# Patient Record
Sex: FEMALE | Race: WHITE | Employment: OTHER | URBAN - METROPOLITAN AREA
[De-identification: names, ages, dates, MRNs, and addresses within clinical notes are randomized per-mention and may not be internally consistent; named-entity substitution may affect disease eponyms.]

---

## 2020-06-06 ENCOUNTER — HOSPITAL ENCOUNTER (EMERGENCY)
Age: 75
Discharge: HOME | End: 2020-06-06
Attending: EMERGENCY MEDICINE
Payer: MEDICARE

## 2020-06-06 ENCOUNTER — APPOINTMENT (OUTPATIENT)
Dept: GENERAL RADIOLOGY | Age: 75
End: 2020-06-06
Payer: MEDICARE

## 2020-06-06 ENCOUNTER — APPOINTMENT (OUTPATIENT)
Dept: CT IMAGING | Age: 75
End: 2020-06-06
Payer: MEDICARE

## 2020-06-06 VITALS
HEART RATE: 80 BPM | OXYGEN SATURATION: 99 % | BODY MASS INDEX: 22.08 KG/M2 | RESPIRATION RATE: 16 BRPM | TEMPERATURE: 97.9 F | WEIGHT: 120 LBS | HEIGHT: 62 IN | SYSTOLIC BLOOD PRESSURE: 153 MMHG | DIASTOLIC BLOOD PRESSURE: 93 MMHG

## 2020-06-06 PROCEDURE — 73110 X-RAY EXAM OF WRIST: CPT

## 2020-06-06 PROCEDURE — 73560 X-RAY EXAM OF KNEE 1 OR 2: CPT

## 2020-06-06 PROCEDURE — 90715 TDAP VACCINE 7 YRS/> IM: CPT | Performed by: EMERGENCY MEDICINE

## 2020-06-06 PROCEDURE — 70450 CT HEAD/BRAIN W/O DYE: CPT

## 2020-06-06 PROCEDURE — 99283 EMERGENCY DEPT VISIT LOW MDM: CPT

## 2020-06-06 PROCEDURE — 12011 RPR F/E/E/N/L/M 2.5 CM/<: CPT

## 2020-06-06 PROCEDURE — 6360000002 HC RX W HCPCS: Performed by: EMERGENCY MEDICINE

## 2020-06-06 PROCEDURE — 90471 IMMUNIZATION ADMIN: CPT | Performed by: EMERGENCY MEDICINE

## 2020-06-06 RX ORDER — LEVOTHYROXINE SODIUM 175 UG/1
175 TABLET ORAL DAILY
COMMUNITY

## 2020-06-06 RX ORDER — ACETAMINOPHEN 500 MG
500 TABLET ORAL EVERY 6 HOURS PRN
COMMUNITY

## 2020-06-06 RX ORDER — LORATADINE 10 MG/1
10 TABLET ORAL DAILY
COMMUNITY

## 2020-06-06 RX ORDER — ASPIRIN 81 MG/1
81 TABLET ORAL DAILY
COMMUNITY

## 2020-06-06 RX ORDER — ATORVASTATIN CALCIUM 20 MG/1
20 TABLET, FILM COATED ORAL DAILY
COMMUNITY

## 2020-06-06 RX ORDER — LIDOCAINE HYDROCHLORIDE 10 MG/ML
5 INJECTION, SOLUTION EPIDURAL; INFILTRATION; INTRACAUDAL; PERINEURAL ONCE
Status: DISCONTINUED | OUTPATIENT
Start: 2020-06-06 | End: 2020-06-06 | Stop reason: HOSPADM

## 2020-06-06 RX ADMIN — TETANUS TOXOID, REDUCED DIPHTHERIA TOXOID AND ACELLULAR PERTUSSIS VACCINE, ADSORBED 0.5 ML: 5; 2.5; 8; 8; 2.5 SUSPENSION INTRAMUSCULAR at 06:50

## 2020-06-06 NOTE — ED PROVIDER NOTES
[facility] Emergency Department    CHIEF COMPLAINT  Chief Complaint   Patient presents with   [place]     states slipped and fell laceration to forehead, laceration to righty wrist and pain in left knee. denies loc      HISTORY OF PRESENT ILLNESS  Jermain iMke is a 76 y.o. female  who presents to the ED complaining of getting up this morning to tend to her  who is here for loss of consciousness. The patient got up from bed quickly to go see what was going on with him and slipped on the wood floor. She injured her left knee, right wrist, and forehead. No LOC in this patient. No vomiting since the injury. No neck or back pains. No chest or abdominal pains. She feels anxious because she is worried about her . She sustained her injuries from the ground, not against any furniture. She was able to ambulate after the injury. On aspirin, no other anticoagulation. No other complaints, modifying factors or associated symptoms. I have reviewed the following from the nursing documentation. Past Medical History:   Diagnosis Date    Hyperlipidemia     Scoliosis     Thyroid disease      History reviewed. No pertinent surgical history. History reviewed. No pertinent family history.   Social History     Socioeconomic History    Marital status:      Spouse name: Not on file    Number of children: Not on file    Years of education: Not on file    Highest education level: Not on file   Occupational History    Not on file   Social Needs    Financial resource strain: Not on file    Food insecurity     Worry: Not on file     Inability: Not on file    Transportation needs     Medical: Not on file     Non-medical: Not on file   Tobacco Use    Smoking status: Never Smoker   Substance and Sexual Activity    Alcohol use: Yes     Comment: occas    Drug use: Never    Sexual activity: Not on file   Lifestyle    Physical activity     Days per week: Not on file noted to the middle of the forehead. No active bleeding or significant asssociated hematoma or other craniofacial trauma apparent. Mucous membranes are dry. NECK: Supple. BACK:      Cervical: no tenderness noted      Thoracic: no tenderness noted      Lumbar: no tenderness noted  EYES: PERRL. EOM's grossly intact. HEART/CHEST: RRR. No murmurs. No chest wall tenderness. LUNGS: Respirations unlabored. CTAB. Good air exchange. Speaking comfortably in full sentences. ABDOMEN: No tenderness. Soft. Non-distended. No masses. No organomegaly. No guarding or rebound. Normal bowel sounds throughout. MUSCULOSKELETAL:  RUE: Minor abrasion to the right wrist without roman laceration, mild tenderness, no other RUE tenderness. 2+ radial pulse. Brisk cap refill x5 digits. Sensation and motor function fully intact in the radial, ulnar, and median nerve distribution. Full range of motion of all major joints. Cardinal movements of hand fully intact. No erythema, bruising, or lacerations. Comparments are soft. LUE:  No tenderness. 2+ radial pulse. Brisk cap refill x5 digits. Sensation and motor function fully intact in the radial, ulnar, and median nerve distribution. Full range of motion of all major joints. Cardinal movements of hand fully intact. No erythema, bruising, or lacerations. Comparments are soft. RLE: Knee minimally tender with bruising noted, no deformity, no other RLE tenderness. 2+ DP and PT. Sensation and motor function fully intact. Full range of motion of all major joints. No erythema, bruising, or lacerations. Compartments are soft. 2+ patellar reflex. Achilles nontender and intact. No joint swelling or effusions are noted. LLE: Knee mildly tender with bruising noted, no other tenderness and no deformity. 2+ DP and PT. Sensation and motor function fully intact. Full range of motion of all major joints. No erythema, bruising, or lacerations. Compartments are soft.   2+ patellar Contusion of left knee, initial encounter    3. Contusion of right knee, initial encounter    4. Abrasion of right wrist, initial encounter    5. Laceration of forehead, initial encounter    6. Tetanus toxoid vaccination administered at current visit    7. Closed head injury, initial encounter        Blood pressure (!) 153/93, pulse 80, temperature 97.9 °F (36.6 °C), temperature source Oral, resp. rate 16, height 5' 2\" (1.575 m), weight 120 lb (54.4 kg), SpO2 99 %. DISPOSITION  Coco Lopez was discharged to home in stable condition. I have discussed the findings of today's workup with the patient and addressed the patient's questions and concerns. Important warning signs as well as new or worsening symptoms which would necessitate immediate return to the ED were discussed. The plan is to discharge from the ED at this time, and the patient is in stable condition. The patient acknowledged understanding is agreeable with this plan. Follow-up with:  Mikael Silva  [address] [address]  [phone]    Schedule an appointment as soon as possible for a visit in 1 week  For wound re-check, For suture removal    [facility]  ED  [address]  Go to   If symptoms worsen      DISCLAIMER: This chart was created using Dragon dictation software. Efforts were made by me to ensure accuracy, however some errors may be present due to limitations of this technology and occasionally words are not transcribed correctly.         Ben Sadler MD  06/06/20 5819

## 2020-06-06 NOTE — ED NOTES

## 2020-06-06 NOTE — ED NOTES
Bed check, fall band and be safe sign outside room. No fall socks because pt wearing gyn shoes.      Kathleen See, [address] [facility]  06/06/20 5978

## 2020-06-13 ENCOUNTER — HOSPITAL ENCOUNTER (EMERGENCY)
Age: 75
Discharge: HOME | End: 2020-06-13
Payer: MEDICARE

## 2020-06-13 VITALS — OXYGEN SATURATION: 95 % | RESPIRATION RATE: 18 BRPM | HEART RATE: 73 BPM | TEMPERATURE: 98.4 F

## 2020-06-19 ENCOUNTER — OFFICE VISIT (OUTPATIENT)
Dept: ORTHOPEDIC SURGERY | Age: 75
End: 2020-06-19
Payer: MEDICARE

## 2020-06-19 VITALS — BODY MASS INDEX: 22.08 KG/M2 | HEIGHT: 62 IN | WEIGHT: 120 LBS

## 2020-06-19 PROCEDURE — 4040F PNEUMOC VAC/ADMIN/RCVD: CPT | Performed by: PHYSICIAN ASSISTANT

## 2020-06-19 PROCEDURE — 1123F ACP DISCUSS/DSCN MKR DOCD: CPT | Performed by: PHYSICIAN ASSISTANT

## 2020-06-19 PROCEDURE — G8420 CALC BMI NORM PARAMETERS: HCPCS | Performed by: PHYSICIAN ASSISTANT

## 2020-06-19 PROCEDURE — 99203 OFFICE O/P NEW LOW 30 MIN: CPT | Performed by: PHYSICIAN ASSISTANT

## 2020-06-19 PROCEDURE — 3017F COLORECTAL CA SCREEN DOC REV: CPT | Performed by: PHYSICIAN ASSISTANT

## 2020-06-19 PROCEDURE — G8427 DOCREV CUR MEDS BY ELIG CLIN: HCPCS | Performed by: PHYSICIAN ASSISTANT

## 2020-06-19 PROCEDURE — 1090F PRES/ABSN URINE INCON ASSESS: CPT | Performed by: PHYSICIAN ASSISTANT

## 2020-06-19 PROCEDURE — 1036F TOBACCO NON-USER: CPT | Performed by: PHYSICIAN ASSISTANT

## 2020-06-19 PROCEDURE — G8400 PT W/DXA NO RESULTS DOC: HCPCS | Performed by: PHYSICIAN ASSISTANT

## 2020-06-19 NOTE — PROGRESS NOTES
Days per week: None     Minutes per session: None    Stress: None   Relationships    Social connections     Talks on phone: None     Gets together: None     Attends Nondenominational service: None     Active member of club or organization: None     Attends meetings of clubs or organizations: None     Relationship status: None    Intimate partner violence     Fear of current or ex partner: None     Emotionally abused: None     Physically abused: None     Forced sexual activity: None   Other Topics Concern    None   Social History Narrative    None     Current Outpatient Medications   Medication Sig Dispense Refill    levothyroxine (SYNTHROID) 175 MCG tablet Take 175 mcg by mouth Daily      loratadine (CLARITIN) 10 MG tablet Take 10 mg by mouth daily      atorvastatin (LIPITOR) 20 MG tablet Take 20 mg by mouth daily      aspirin 81 MG EC tablet Take 81 mg by mouth daily      Multiple Vitamins-Minerals (MULTIVITAMIN ADULT PO) Take by mouth      Cholecalciferol (VITAMIN D3) 25 MCG (1000 UT) CAPS Take by mouth      acetaminophen (TYLENOL) 500 MG tablet Take 500 mg by mouth every 6 hours as needed for Pain       No current facility-administered medications for this visit. Review of Systems:  Relevant review of systems reviewed and available in the patient's chart    Vital Signs:  Ht 5' 2\" (1.575 m)   Wt 120 lb (54.4 kg)   BMI 21.95 kg/m²     General Exam:   Constitutional: Patient is adequately groomed with no evidence of malnutrition  DTRs: Deep tendon reflexes are intact  Mental Status: The patient is oriented to time, place and person. The patient's mood and affect are appropriate. Lymphatic: The lymphatic examination bilaterally reveals all areas to be without enlargement or induration. Vascular: Examination reveals no swelling or calf tenderness. Peripheral pulses are palpable and 2+. Neurological: The patient has good coordination. There is no weakness or sensory deficit.     Body mass index is 21.95 kg/m². Left and right knee Examination:    Inspection:  No swelling, ecchymosis or deformity    Palpation: Right tenderness to palpation directly over the medial joint line. Left knee tenderness patient directly over the lateral joint line. Range of Motion:  Well-preserved range of motion, 0-120°. Strength:  4+/5 quad and hamstring strength    Special Tests:  Positive Adelso's examination. Stable to varus and valgus stress testing. Negative Lachman's exam    Skin: There are no rashes, ulcerations or lesions. Gait: Mild antalgic gait    Reflex normal deep tendon reflexes    Additional Comments:         Examination of the right and left hip reveals intact skin. The patient demonstrates full painless range of motion with regards to flexion, abduction, internal and external rotation. There is no tenderness about the greater trochanter. There is a negative straight leg raise against resistance. Strength is 5/5 throughout all planes. Radiology:     X-rays obtained and reviewed in office:  Views 4 views including AP weightbearing, PA flexed weightbearing, lateral, and skyline  Location right and left knee  Impression only minimal thinning of the medial lateral joint surface with osteophyte formation. There is right greater than left lateral patella facet joint space thinning. No evidence of fractures or dislocations           Impression:  Encounter Diagnosis   Name Primary?     Pain in both knees, unspecified chronicity Yes       Office Procedures:  Orders Placed This Encounter   Procedures    XR KNEE RIGHT (MIN 4 VIEWS)     Standing Status:   Future     Number of Occurrences:   1     Standing Expiration Date:   6/17/2021    XR KNEE LEFT (MIN 4 VIEWS)     Standing Status:   Future     Number of Occurrences:   1     Standing Expiration Date:   6/17/2021    MRI KNEE LEFT WO CONTRAST     Standing Status:   Future     Standing Expiration Date:   6/19/2021     Scheduling Instructions:      [facility] [phone]      MRI LT KNEE W/O CONTRAST      PAIN R/O MMT/LMT FX            SCHED: PT TO CALL    MRI KNEE RIGHT WO CONTRAST     Standing Status:   Future     Standing Expiration Date:   6/19/2021     Scheduling Instructions:      [facility] [phone]      MRI RT KNEE W/O CONTRAST      PAIN R/O MMT/LMT FX            SCHED: PT TO CALL       Treatment Plan: Patient has been ordered an MRI of the left knee to evaluate for lateral meniscus tear. She is also ordered an MRI of the right knee to look for medial meniscus tear. She will continue with over-the-counter conservative medications ice and activity modifications until follow-up.

## 2020-06-30 ENCOUNTER — HOSPITAL ENCOUNTER (OUTPATIENT)
Dept: MRI IMAGING | Age: 75
Discharge: HOME | End: 2020-06-30
Payer: MEDICARE

## 2020-06-30 PROCEDURE — 73721 MRI JNT OF LWR EXTRE W/O DYE: CPT

## 2020-07-01 ENCOUNTER — TELEPHONE (OUTPATIENT)
Dept: ORTHOPEDIC SURGERY | Age: 75
End: 2020-07-01

## 2020-07-01 NOTE — TELEPHONE ENCOUNTER
Please call patient with MRI results. Said she got a call on her v-mail from Dr. Roger Garcia that instructed her to call you for the results today.

## 2020-07-01 NOTE — TELEPHONE ENCOUNTER
Spoke to the patient. Informed her that she had a nondisplaced fracture of the patella. I would recommend crutches or a walker for protected weightbearing in a knee brace for immobilization.   She is scheduled to see us in a 2 to 3 weeks o for repeat clinical exam.

## 2020-07-22 ENCOUNTER — OFFICE VISIT (OUTPATIENT)
Dept: ORTHOPEDIC SURGERY | Age: 75
End: 2020-07-22
Payer: MEDICARE

## 2020-07-22 PROCEDURE — G8400 PT W/DXA NO RESULTS DOC: HCPCS | Performed by: ORTHOPAEDIC SURGERY

## 2020-07-22 PROCEDURE — 3017F COLORECTAL CA SCREEN DOC REV: CPT | Performed by: ORTHOPAEDIC SURGERY

## 2020-07-22 PROCEDURE — G8420 CALC BMI NORM PARAMETERS: HCPCS | Performed by: ORTHOPAEDIC SURGERY

## 2020-07-22 PROCEDURE — 1090F PRES/ABSN URINE INCON ASSESS: CPT | Performed by: ORTHOPAEDIC SURGERY

## 2020-07-22 PROCEDURE — 99213 OFFICE O/P EST LOW 20 MIN: CPT | Performed by: ORTHOPAEDIC SURGERY

## 2020-07-22 PROCEDURE — G8427 DOCREV CUR MEDS BY ELIG CLIN: HCPCS | Performed by: ORTHOPAEDIC SURGERY

## 2020-07-22 PROCEDURE — 4040F PNEUMOC VAC/ADMIN/RCVD: CPT | Performed by: ORTHOPAEDIC SURGERY

## 2020-07-22 PROCEDURE — 1123F ACP DISCUSS/DSCN MKR DOCD: CPT | Performed by: ORTHOPAEDIC SURGERY

## 2020-07-22 PROCEDURE — 1036F TOBACCO NON-USER: CPT | Performed by: ORTHOPAEDIC SURGERY

## 2020-07-22 NOTE — PROGRESS NOTES
Chief Complaint    Results (BILAT KNEE MRI)      History of Present Illness:  Leann Parmar is a 76 y.o. female presents the office today for follow-up of her left knee pain. .  She initially had a fall onto the anterior aspect of left knee approximately 4 to 6 weeks ago. She returns today for MRI follow-up. At this point she states that she has no left knee pain catching or locking. She is able to flex and extend her leg without any discomfort. Pain Assessment  Location of Pain: Knee  Location Modifiers: Right, Left  Severity of Pain: 0  Work-Related Injury: No  Are there other pain locations you wish to document?: No    Medical History:  Past Medical History:   Diagnosis Date    Hyperlipidemia     Scoliosis     Thyroid disease      There are no active problems to display for this patient. No past surgical history on file. No family history on file.   Social History     Socioeconomic History    Marital status:      Spouse name: Not on file    Number of children: Not on file    Years of education: Not on file    Highest education level: Not on file   Occupational History    Not on file   Social Needs    Financial resource strain: Not on file    Food insecurity     Worry: Not on file     Inability: Not on file    Transportation needs     Medical: Not on file     Non-medical: Not on file   Tobacco Use    Smoking status: Never Smoker    Smokeless tobacco: Never Used   Substance and Sexual Activity    Alcohol use: Yes     Comment: occas    Drug use: Never    Sexual activity: Not on file   Lifestyle    Physical activity     Days per week: Not on file     Minutes per session: Not on file    Stress: Not on file   Relationships    Social connections     Talks on phone: Not on file     Gets together: Not on file     Attends Anabaptist service: Not on file     Active member of club or organization: Not on file     Attends meetings of clubs or organizations: Not on file     Relationship status: